# Patient Record
Sex: MALE | Race: OTHER | HISPANIC OR LATINO | Employment: FULL TIME | URBAN - METROPOLITAN AREA
[De-identification: names, ages, dates, MRNs, and addresses within clinical notes are randomized per-mention and may not be internally consistent; named-entity substitution may affect disease eponyms.]

---

## 2024-09-21 ENCOUNTER — HOSPITAL ENCOUNTER (OUTPATIENT)
Dept: RADIOLOGY | Facility: MEDICAL CENTER | Age: 29
End: 2024-09-21
Attending: NURSE PRACTITIONER
Payer: COMMERCIAL

## 2024-09-21 ENCOUNTER — OCCUPATIONAL MEDICINE (OUTPATIENT)
Dept: URGENT CARE | Facility: CLINIC | Age: 29
End: 2024-09-21
Payer: COMMERCIAL

## 2024-09-21 VITALS
HEART RATE: 60 BPM | SYSTOLIC BLOOD PRESSURE: 120 MMHG | TEMPERATURE: 97.3 F | HEIGHT: 70 IN | RESPIRATION RATE: 12 BRPM | BODY MASS INDEX: 29.35 KG/M2 | OXYGEN SATURATION: 98 % | DIASTOLIC BLOOD PRESSURE: 82 MMHG | WEIGHT: 205 LBS

## 2024-09-21 DIAGNOSIS — N50.812 PAIN IN LEFT TESTICLE: ICD-10-CM

## 2024-09-21 DIAGNOSIS — S39.012A STRAIN OF LUMBAR REGION, INITIAL ENCOUNTER: ICD-10-CM

## 2024-09-21 LAB
APPEARANCE UR: CLEAR
BILIRUB UR STRIP-MCNC: NEGATIVE MG/DL
COLOR UR AUTO: NORMAL
GLUCOSE UR STRIP.AUTO-MCNC: NEGATIVE MG/DL
KETONES UR STRIP.AUTO-MCNC: NORMAL MG/DL
LEUKOCYTE ESTERASE UR QL STRIP.AUTO: NEGATIVE
NITRITE UR QL STRIP.AUTO: NEGATIVE
PH UR STRIP.AUTO: 6 [PH] (ref 5–8)
PROT UR QL STRIP: NEGATIVE MG/DL
RBC UR QL AUTO: NEGATIVE
SP GR UR STRIP.AUTO: >=1.03
UROBILINOGEN UR STRIP-MCNC: NORMAL MG/DL

## 2024-09-21 PROCEDURE — 3079F DIAST BP 80-89 MM HG: CPT | Performed by: NURSE PRACTITIONER

## 2024-09-21 PROCEDURE — 3074F SYST BP LT 130 MM HG: CPT | Performed by: NURSE PRACTITIONER

## 2024-09-21 PROCEDURE — 81002 URINALYSIS NONAUTO W/O SCOPE: CPT | Performed by: NURSE PRACTITIONER

## 2024-09-21 PROCEDURE — 76870 US EXAM SCROTUM: CPT

## 2024-09-21 PROCEDURE — 99203 OFFICE O/P NEW LOW 30 MIN: CPT | Performed by: NURSE PRACTITIONER

## 2024-09-21 ASSESSMENT — ENCOUNTER SYMPTOMS
NEUROLOGICAL NEGATIVE: 1
ABDOMINAL PAIN: 0
GASTROINTESTINAL NEGATIVE: 1
BACK PAIN: 1
RESPIRATORY NEGATIVE: 1
CONSTITUTIONAL NEGATIVE: 1
CARDIOVASCULAR NEGATIVE: 1
SENSORY CHANGE: 0
FEVER: 0
WEAKNESS: 0

## 2024-09-21 ASSESSMENT — VISUAL ACUITY: OU: 1

## 2024-09-21 NOTE — LETTER
PHYSICIAN’S AND CHIROPRACTIC PHYSICIAN'S   PROGRESS REPORT CERTIFICATION OF DISABILITY Claim Number:     Social Security Number:    Patient’s Name: Tai Reid Date of Injury: 9/19/2024   Employer:   Name of MCO (if applicable):      Patient’s Job Description/Occupation: [employer]       Previous Injuries/Diseases/Surgeries Contributing to the Condition:  No      Diagnosis: (S39.012A) Strain of lumbar region, initial encounter  (N50.812) Pain in left testicle      Related to the Industrial Injury? Yes     Explain: Left back and testicle pain after lifting drilling yolanda      Objective Medical Findings: Low back: mild TTP left paraspinals, no swelling, deformity, negative SLR bilaterally; L testicle +cremaster, TTP, no obvious hernia, no swelling         None - Discharged                         Stable  Yes                 Ratable  No        Generally Improved                         Condition Worsened                  Condition Same  May Have Suffered a Permanent Disability No     Treatment Plan:    Initial visit. US testicles pending. OTC ibuprofen PRN. Warm compress PRN. Work restrictions. Follow up in 5 days. Monitor. Seek immediate medical attention if symptoms change/worsen.         No Change in Therapy                  PT/OT Prescribed                      Medication May be Used While Working        Case Management                          PT/OT Discontinued    Consultation    Further Diagnostic Studies:    Prescription(s)                 Released to FULL DUTY /No Restrictions on (Date):  From:      Certified TOTALLY TEMPORARILY DISABLED (Indicate Dates) From:   To:    X  Released to RESTRICTED/Modified Duty on (Date): From: 9/21/2024 To: 9/26/2024  Restrictions Are:  Temporary      No Sitting    No Standing    No Pulling Other: Pushing, pulling, lifting carrying limited to 10 lb       No Bending at Waist X    No Stooping     No Lifting        No Carrying     No Walking Lifting  Restricted to (lbs.):  < or = to 10 pounds       No Pushing        No Climbing     No Reaching Above Shoulders       Date of Next Visit:  9/26/2024 Date of this Exam: 9/21/2024 Physician/Chiropractic Physician Name: BOB Stanton Physician/Chiropractic Physician Signature:  Silvio Rawls DO MPH                                                                                                                                                                                                            D-39 (Rev. 2/24)

## 2024-09-21 NOTE — LETTER
"    EMPLOYEE’S CLAIM FOR COMPENSATION/ REPORT OF INITIAL TREATMENT  FORM C-4  PLEASE TYPE OR PRINT    EMPLOYEE’S CLAIM - PROVIDE ALL INFORMATION REQUESTED   First Name                    MICAH Patel                  Last Name  Keanu Reid Birthdate                    1995                Sex  Male Claim Number (Insurer’s Use Only)     Home Address  [address] Age  29 y.o. Height  1.78 m (5' 10.08\") Weight  93 kg (205 lb) Social Security Number     [facility] [place]  [address] Telephone  [phone] (home)    Mailing Address  [address] [facility] Zip  [address] Primary Language Spoken  Slovenian    INSURER   THIRD-PARTY   [employer]   Employee's Occupation (Job Title) When Injury or Occupational Disease Occurred  [employer]    Employer's Name/Company Name     Telephone  [phone]    Office Mail Address (Number and Street)  [address]     Date of Injury (if applicable) 9/19/2024               Hours Injury (if applicable)  12:00 PM Date Employer Notified  9/21/2024 Last Day of Work after Injury or Occupational Disease  9/21/2024 Supervisor to Whom Injury Reported  Taryn Lang   Address or Location of Accident (if applicable)  Work [1]   What were you doing at the time of accident? (if applicable)  [employer]    How did this injury or occupational disease occur? (Be specific and answer in detail. Use additional sheet if necessary)  [place]   If you believe that you have an occupational disease, when did you first have knowledge of the disability and its relationship to your employment?  n/a Witnesses to the Accident (if applicable)  parris      Nature of Injury or Occupational Disease  Strain  Part(s) of Body Injured or Affected  Abdomen Including Groin N/A N/A    I CERTIFY THAT THE ABOVE IS TRUE AND " CORRECT TO T HE BEST OF MY KNOWLEDGE AND THAT I HAVE PROVIDED THIS INFORMATION IN ORDER TO OBTAIN THE BENEFITS OF [place], OR [place]  I HEREBY AUTHORIZE ANY PHYSICIAN, CHIROPRACTOR, SURGEON, PRACTITIONER OR ANY OTHER PERSON, ANY HOSPITAL, INCLUDING [facility] OR [facility], ANY  MEDICAL SERVICE ORGANIZATION, ANY INSURANCE COMPANY, OR OTHER INSTITUTION OR ORGANIZATION TO RELEASE TO EACH OTHER, ANY MEDICAL OR OTHER INFORMATION, INCLUDING BENEFITS PAID OR PAYABLE, PERTINENT TO THIS INJURY OR DISEASE, EXCEPT INFORMATION RELATIVE TO DIAGNOSIS, TREATMENT AND/OR COUNSELING FOR AIDS, PSYCHOLOGICAL CONDITIONS, ALCOHOL OR CONTROLLED SUBSTANCES, FOR WHICH I MUST GIVE SPECIFIC AUTHORIZATION.  A PHOTOSTAT OF THIS AUTHORIZATION SHALL BE VALID AS THE ORIGINAL.     Date   Place Employee’s Original or  *Electronic Signature   THIS REPORT MUST BE COMPLETED AND MAILED WITHIN 3 WORKING DAYS OF TREATMENT   Place  [facility]    Name of Facility  [facility]   Date 9/21/2024 Diagnosis and Description of Injury or Occupational Disease  (S39.012A) Strain of lumbar region, initial encounter  (N50.812) Pain in left testicle  Diagnoses of Strain of lumbar region, initial encounter and Pain in left testicle were pertinent to this visit. Is there evidence that the injured employee was under the influence of alcohol and/or another controlled substance at the time of accident?  []No  [] Yes (if yes, please explain)   Hour 11:45 AM  No   Treatment: Initial visit. US testicles pending. OTC ibuprofen PRN. Warm compress PRN. Work restrictions. Follow up in 5 days. Monitor. Seek immediate medical attention if symptoms change/worsen.    Have you advised the patient to remain off work five days or more?   [] Yes Indicate dates: From   To    [x] No      If no, is the injured employee capable of: [] full duty [x] modified duty                      If modified duty, specify any limitations / restrictions:  Per D39                                                                                                                                                                                                                                                                                                                                                                                                               X-Ray Findings:   Comments:US pending    From information given by the employee, together with medical evidence, can you directly connect this injury or occupational disease as job incurred?  []Yes   [] No Yes    Is additional medical care by a physician indicated? []Yes [] No  Yes    Do you know of any previous injury or disease contributing to this condition or occupational disease? []Yes [] No (Explain if yes)                          No   Date  9/21/2024 Print Health Care Provider’s Name  BOB Stanton I certify that the employer’s copy of  this form was delivered to the employer on:   Address  [address] [place] [address] INSURER'S USE ONLY                       [facility]  [address] Provider’s Tax ID Number  [id number]   Telephone  Dept: [phone]    Health Care Provider’s Original or Electronic Signature  e-PEGGY ColvinP.R.NZofia Degree (MD,DO, DC,PA-C,APRN)  APRN  Choose (if applicable)      ORIGINAL - TREATING HEALTHCARE PROVIDER PAGE 2 - INSURER/TPA PAGE 3 - EMPLOYER PAGE 4 - EMPLOYEE             Form C-4 (rev.08/23)

## 2024-09-21 NOTE — PROGRESS NOTES
"Subjective:     Tai Reid is a 29 y.o. male who presents for Back Pain (Was at work on the 19 and lifted a heavy item and states that after getting home pain started in the back to belly button and radiated to testicle pain )      DOI: 9/19/2024 @ 12:00 PM. Initial visit.    Patient works as a . Was lifting a heavy drilling yolanda. Felt fine at the time, but later that evening, he started to experience new onset of left lower back pain as well as left testicular pain.  Reports chief concern is left testicular pain which is a 5/10.  Worsens with palpation.  Left lower back mildly tender.  No dysuria, fever, abdominal pain, sensory changes, weakness, or other symptoms.  Denies concern for STD.  Denies previous injury or problems with the affected areas.  Denies second job.    Gabonese translation provided by professional video conferencing service.    Review of Systems   Constitutional: Negative.  Negative for fever.   Respiratory: Negative.     Cardiovascular: Negative.    Gastrointestinal: Negative.  Negative for abdominal pain.   Genitourinary:  Negative for dysuria, frequency and urgency.        L testicle pain   Musculoskeletal:  Positive for back pain (Left lower).   Neurological: Negative.  Negative for sensory change and weakness.   All other systems reviewed and are negative.    Refer to HPI for additional details.    During this visit, appropriate PPE was worn and hand hygiene was performed.    PMH: No pertinent past medical history to this problem  MEDS: Medications were reviewed in Epic  ALLERGIES: Allergies were reviewed in Epic  SOCHX: Works as a   FH: No pertinent family history to this problem      Objective:     /82 (BP Location: Left arm, Patient Position: Sitting)   Pulse 60   Temp 36.3 °C (97.3 °F) (Temporal)   Resp 12   Ht 1.78 m (5' 10.08\")   Wt 93 kg (205 lb)   SpO2 98%   BMI 29.35 kg/m²     Physical Exam  Nursing note reviewed.   Constitutional:       " General: He is not in acute distress.     Appearance: He is well-developed. He is not ill-appearing or toxic-appearing.   Eyes:      General: Vision grossly intact.   Cardiovascular:      Rate and Rhythm: Normal rate.   Pulmonary:      Effort: Pulmonary effort is normal. No respiratory distress.   Abdominal:      Hernia: There is no hernia in the left inguinal area or right inguinal area.   Genitourinary:     Testes: Cremasteric reflex is present.         Right: Tenderness or swelling not present. Cremasteric reflex is present.          Left: Tenderness present. Swelling not present. Cremasteric reflex is present.    Musculoskeletal:         General: No deformity. Normal range of motion.      Lumbar back: Tenderness (L paraspinals) present. No swelling or deformity. Normal range of motion. Negative right straight leg raise test and negative left straight leg raise test.   Lymphadenopathy:      Lower Body: No left inguinal adenopathy.   Skin:     General: Skin is warm and dry.      Coloration: Skin is not pale.   Neurological:      Mental Status: He is alert and oriented to person, place, and time.      Motor: No weakness.      Gait: Gait normal.   Psychiatric:         Behavior: Behavior normal. Behavior is cooperative.     UA: trace ketone, otherwise unremarkable      Assessment/Plan:     1. Strain of lumbar region, initial encounter    2. Pain in left testicle  - POCT Urinalysis  - [id number]; Future    Initial visit. US testicles pending to evaluate for hernia, torsion, epididymitis, etc. OTC ibuprofen PRN. Warm compress PRN. Work restrictions. Follow up in 5 days. Monitor. Seek immediate medical attention if symptoms change/worsen.     Differential diagnosis, natural history, supportive care, over-the-counter symptom management per 's instructions, close monitoring, and indications for immediate follow-up discussed.     All questions answered. Patient agrees with the plan of care.

## 2024-09-22 ENCOUNTER — TELEPHONE (OUTPATIENT)
Dept: URGENT CARE | Facility: CLINIC | Age: 29
End: 2024-09-22
Payer: COMMERCIAL

## 2024-09-22 NOTE — TELEPHONE ENCOUNTER
----- Message from Nurse Practitioner BOB Bonilla sent at 9/22/2024  1:04 PM PDT -----  Patient speaks Azeri. Please call and inform that ultrasound of testicles was normal. Symptoms likely related to muscle strain. Recommend ibuprofen and warm compress. Continue work restrictions. Follow up 9/26/2024 in urgent care. Monitor. Seek immediate medical attention if symptoms change/worsen.     Thank you.

## 2024-09-26 ENCOUNTER — OCCUPATIONAL MEDICINE (OUTPATIENT)
Dept: URGENT CARE | Facility: CLINIC | Age: 29
End: 2024-09-26
Payer: COMMERCIAL

## 2024-09-26 VITALS
RESPIRATION RATE: 16 BRPM | TEMPERATURE: 97.9 F | OXYGEN SATURATION: 99 % | HEIGHT: 70 IN | HEART RATE: 63 BPM | WEIGHT: 206 LBS | SYSTOLIC BLOOD PRESSURE: 114 MMHG | BODY MASS INDEX: 29.49 KG/M2 | DIASTOLIC BLOOD PRESSURE: 74 MMHG
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
## 2024-09-26 DIAGNOSIS — S39.012A STRAIN OF LUMBAR REGION, INITIAL ENCOUNTER: ICD-10-CM

## 2024-09-26 DIAGNOSIS — N50.812 PAIN IN LEFT TESTICLE: ICD-10-CM

## 2024-09-26 PROCEDURE — 3078F DIAST BP <80 MM HG: CPT | Performed by: NURSE PRACTITIONER

## 2024-09-26 PROCEDURE — 3074F SYST BP LT 130 MM HG: CPT | Performed by: NURSE PRACTITIONER

## 2024-09-26 PROCEDURE — 99213 OFFICE O/P EST LOW 20 MIN: CPT | Performed by: NURSE PRACTITIONER

## 2024-09-26 RX ORDER — METHOCARBAMOL 500 MG/1
1000 TABLET, FILM COATED ORAL 4 TIMES DAILY
Qty: 120 TABLET | Refills: 0 | Status: SHIPPED | OUTPATIENT
Start: 2024-09-26 | End: 2024-10-06

## 2024-09-26 NOTE — LETTER
PHYSICIAN’S AND CHIROPRACTIC PHYSICIAN'S   PROGRESS REPORT CERTIFICATION OF DISABILITY Claim Number:     Social Security Number:    Patient’s Name: Tai Reid Date of Injury: 9/19/2024   Employer:   Name of MCO (if applicable):      Patient’s Job Description/Occupation: [employer]       Previous Injuries/Diseases/Surgeries Contributing to the Condition:  None      Diagnosis: (S39.012A) Strain of lumbar region, initial encounter  (N50.812) Pain in left testicle  (Y99.0) Work related injury      Related to the Industrial Injury? Yes     Explain: Visit 2.  9/26/2024: Patient notes improvement in his low back pain and left testicle since initial visit.  He does have occasional mild pain but this has been improving.  He has been adhering to his work restrictions.  He is using ibuprofen with good relief of his discomfort.  There is no pain with urination however he does have discomfort with palpation of his testicle.  Negative for swelling, erythema or drainage.      Objective Medical Findings:  Tenderness: There is abdominal tenderness. There is no guarding or rebound.   Musculoskeletal:         General: Normal range of motion.      Cervical back: Normal range of motion and neck supple. No rigidity.      Lumbar back: Tenderness present. No swelling, signs of trauma, spasms or bony tenderness. Normal range of motion.            None - Discharged                         Stable  Yes                 Ratable  Yes     X   Generally Improved                         Condition Worsened                  Condition Same  May Have Suffered a Permanent Disability No     Treatment Plan:    Continue with work restrictions, ibuprofen, rest, stretching and ice.  Robaxin sent to pharmacy for further relief of inflammation/strain and spasms.         No Change in Therapy                  PT/OT Prescribed                      Medication May be Used While Working        Case Management                          PT/OT  Discontinued    Consultation    Further Diagnostic Studies:    Prescription(s)      Robaxin          Released to FULL DUTY /No Restrictions on (Date):  From:      Certified TOTALLY TEMPORARILY DISABLED (Indicate Dates) From:   To:    X  Released to RESTRICTED/Modified Duty on (Date): From: 9/26/2024 To: 10/3/2024  Restrictions Are:         No Sitting    No Standing    No Pulling Other:         No Bending at Waist     No Stooping X    No Lifting        No Carrying     No Walking Lifting Restricted to (lbs.):  < or = to 10 pounds       No Pushing        No Climbing     No Reaching Above Shoulders       Date of Next Visit:  10/3/2024 Date of this Exam: 9/26/2024 Physician/Chiropractic Physician Name: CHRIS SnyderRZofiaN. Physician/Chiropractic Physician Signature:  Silvio Rawls DO MPH                                                                                                                                                                                                            D-39 (Rev. 2/24)

## 2024-09-26 NOTE — PROGRESS NOTES
"Subjective:     Tai Reid is a 29 y.o. male who presents for Other (Workers comp f/u DOI 09/19. Pt injured lower back and testicles by lifting heavy equipment at work. Pt feels a little better, but still having pain)      Other      DOI: 9/19/2024 @ 12:00 PM. Initial visit.     Patient works as a . Was lifting a heavy drilling yolanda. Felt fine at the time, but later that evening, he started to experience new onset of left lower back pain as well as left testicular pain.  Reports chief concern is left testicular pain which is a 5/10.  Worsens with palpation.  Left lower back mildly tender.  No dysuria, fever, abdominal pain, sensory changes, weakness, or other symptoms.  Denies concern for STD.  Denies previous injury or problems with the affected areas.  Denies second job.         ROS    PMH: No past medical history on file.  ALLERGIES: No Known Allergies  SURGHX: No past surgical history on file.  SOCHX:   Social History     Socioeconomic History    Marital status: Unknown   Tobacco Use    Smoking status: Every Day     Types: Cigarettes    Smokeless tobacco: Never    Tobacco comments:     Smokes 8-9 cigarretes a day    Substance and Sexual Activity    Alcohol use: Yes     Comment: rarely    Drug use: Never    Sexual activity: Not Currently     Partners: Female     FH: No family history on file.      Objective:   /74 (BP Location: Left arm, Patient Position: Sitting, BP Cuff Size: Adult)   Pulse 63   Temp 36.6 °C (97.9 °F) (Temporal)   Resp 16   Ht 1.778 m (5' 10\")   Wt 93.4 kg (206 lb)   SpO2 99%   BMI 29.56 kg/m²     Physical Exam  Vitals and nursing note reviewed.   Constitutional:       General: He is not in acute distress.     Appearance: Normal appearance. He is normal weight. He is not ill-appearing or toxic-appearing.   HENT:      Head: Normocephalic.      Right Ear: Tympanic membrane, ear canal and external ear normal. There is no impacted cerumen.      Left Ear: " Tympanic membrane, ear canal and external ear normal. There is no impacted cerumen.      Nose: Nose normal.      Mouth/Throat:      Mouth: Mucous membranes are moist.   Eyes:      General:         Right eye: No discharge.         Left eye: No discharge.      Extraocular Movements: Extraocular movements intact.      Conjunctiva/sclera: Conjunctivae normal.      Pupils: Pupils are equal, round, and reactive to light.   Cardiovascular:      Rate and Rhythm: Normal rate and regular rhythm.      Pulses: Normal pulses.      Heart sounds: Normal heart sounds.   Pulmonary:      Effort: Pulmonary effort is normal.      Breath sounds: Normal breath sounds.   Abdominal:      General: Abdomen is flat. There is no distension.      Tenderness: There is abdominal tenderness. There is no guarding or rebound.   Musculoskeletal:         General: Normal range of motion.      Cervical back: Normal range of motion and neck supple. No rigidity.      Lumbar back: Tenderness present. No swelling, signs of trauma, spasms or bony tenderness. Normal range of motion.   Lymphadenopathy:      Cervical: No cervical adenopathy.   Skin:     General: Skin is warm and dry.   Neurological:      General: No focal deficit present.      Mental Status: He is alert and oriented to person, place, and time. Mental status is at baseline.   Psychiatric:         Mood and Affect: Mood normal.         Behavior: Behavior normal.         Judgment: Judgment normal.         Assessment/Plan:   Assessment    1. Strain of lumbar region, initial encounter  methocarbamol (ROBAXIN) 500 MG Tab      2. Pain in left testicle  methocarbamol (ROBAXIN) 500 MG Tab      3. Work related injury        Patient to continue with work restrictions of no heavy lifting greater than 10 pounds.  We did discuss stretching, ibuprofen/Tylenol, ice and rest.  His symptoms fortunately are improving at this time.  Anticipate full duty trial at next visit.  He was prescribed muscle relaxer for  further relief of back strain.  Next visit in 7 days.